# Patient Record
Sex: MALE
[De-identification: names, ages, dates, MRNs, and addresses within clinical notes are randomized per-mention and may not be internally consistent; named-entity substitution may affect disease eponyms.]

---

## 2020-10-02 ENCOUNTER — NURSE TRIAGE (OUTPATIENT)
Dept: OTHER | Facility: CLINIC | Age: 40
End: 2020-10-02

## 2020-10-02 NOTE — TELEPHONE ENCOUNTER
Reason for Disposition   [1] COVID-19 EXPOSURE (Close Contact) AND [2] within last 14 days BUT [3] NO symptoms    Answer Assessment - Initial Assessment Questions  1. CLOSE CONTACT: \"Who is the person with the confirmed or suspected COVID-19 infection that you were exposed to? \"      Someone in his office  2. PLACE of CONTACT: \"Where were you when you were exposed to COVID-19? \" (e.g., home, school, medical waiting room; which city?)      Work office  3. TYPE of CONTACT: \"How much contact was there? \" (e.g., sitting next to, live in same house, work in same office, same building)      Sat in excess of 10 feet  4. DURATION of CONTACT: \"How long were you in contact with the COVID-19 patient? \" (e.g., a few seconds, passed by person, a few minutes, live with the patient)      2 hours  5. DATE of CONTACT: \"When did you have contact with a COVID-19 patient? \" (e.g., how many days ago)      2 days ago  6. TRAVEL: \"Have you traveled out of the country recently? \" If so, \"When and where? \"      * Also ask about out-of-state travel, since the CDC has identified some high-risk cities for community spread in the [address] [place] [id number]. * Note: Travel becomes less relevant if there is widespread community transmission where the patient lives. No  7. COMMUNITY SPREAD: \"Are there lots of cases of COVID-19 (community spread) where you live? \" (See public health department website, if unsure)        NA  8. SYMPTOMS: \"Do you have any symptoms? \" (e.g., fever, cough, breathing difficulty)      No  9. PREGNANCY OR POSTPARTUM: \"Is there any chance you are pregnant? \" \"When was your last menstrual period? \" \"Did you deliver in the last 2 weeks? \"      NA  10. HIGH RISK: \"Do you have any heart or lung problems? Do you have a weak immune system? \" (e.g., CHF, COPD, asthma, HIV positive, chemotherapy, renal failure, diabetes mellitus, sickle cell anemia)        No    Protocols used: CORONAVIRUS (COVID-19) EXPOSURE-ADULT-    Directed caller to follow up with PCP. Caller agreeable.

## 2021-01-19 ENCOUNTER — NURSE TRIAGE (OUTPATIENT)
Dept: OTHER | Facility: CLINIC | Age: 41
End: 2021-01-19

## 2021-01-19 NOTE — TELEPHONE ENCOUNTER
Reason for Disposition   Painful rash with multiple small blisters grouped together (i.e., dermatomal distribution or 'band' or 'stripe')    Answer Assessment - Initial Assessment Questions  1. APPEARANCE of RASH: \"Describe the rash. \"       Red, blister type rash    2. LOCATION: \"Where is the rash located? \"       On back 2\" diameter    3. NUMBER: \"How many spots are there? \"       Numerous    4. SIZE: \"How big are the spots? \" (Inches, centimeters or compare to size of a coin)       Slightly larger than pen tip. 5. ONSET: \"When did the rash start? \"       2 weeks ago    6. ITCHING: \"Does the rash itch? \" If so, ask: \"How bad is the itch? \"  (Scale 1-10; or mild, moderate, severe)      4-5/10    7. PAIN: \"Does the rash hurt? \" If so, ask: \"How bad is the pain? \"  (Scale 1-10; or mild, moderate, severe)      4-5/10    8. OTHER SYMPTOMS: \"Do you have any other symptoms? \" (e.g., fever)      Edema    9. PREGNANCY: \"Is there any chance you are pregnant? \" \"When was your last menstrual period? \"      N/A    Protocols used: RASH OR REDNESS - LOCALIZED-ADULT-[place]    Brief description of triage: rash on back that has spread with edema x2 weeks. Triage indicates for patient to contact PCP for appt or go to walk in [facility] or [facility]. Care advice provided, patient verbalizes understanding; denies any other questions or concerns; instructed to call back for any new or worsening symptoms. This triage is a result of a call to [address] [place] [address]. Please do not respond to the triage nurse through this encounter. Any subsequent communication should be directly with the patient.